# Patient Record
Sex: MALE | Race: WHITE | NOT HISPANIC OR LATINO | Employment: OTHER | URBAN - METROPOLITAN AREA
[De-identification: names, ages, dates, MRNs, and addresses within clinical notes are randomized per-mention and may not be internally consistent; named-entity substitution may affect disease eponyms.]

---

## 2018-08-16 NOTE — TELEPHONE ENCOUNTER
FUTURE VISIT INFORMATION      FUTURE VISIT INFORMATION:    Date: 8/24/18    Time: 0920    Location: [facility]  REFERRAL INFORMATION:    Referring provider:  N/A    Referring providers clinic:  N/A    Reason for visit/diagnosis  LUMBAR STENOSIS      RECORDS STATUS      RECORDS IN CHART

## 2018-08-22 DIAGNOSIS — M48.061 SPINAL STENOSIS OF LUMBAR REGION WITHOUT NEUROGENIC CLAUDICATION: Primary | ICD-10-CM

## 2018-08-24 ENCOUNTER — PRE VISIT (OUTPATIENT)
Dept: ORTHOPEDICS | Facility: CLINIC | Age: 68
End: 2018-08-24

## 2022-05-09 ENCOUNTER — TRANSCRIBE ORDERS (OUTPATIENT)
Dept: OTHER | Age: 72
End: 2022-05-09
Payer: MEDICARE

## 2022-05-09 DIAGNOSIS — M17.12 PRIMARY OSTEOARTHRITIS OF LEFT KNEE: Primary | ICD-10-CM

## 2022-05-26 ENCOUNTER — THERAPY VISIT (OUTPATIENT)
Dept: PHYSICAL THERAPY | Facility: CLINIC | Age: 72
End: 2022-05-26
Payer: MEDICARE

## 2022-05-26 DIAGNOSIS — Z47.1 AFTERCARE FOLLOWING JOINT REPLACEMENT: ICD-10-CM

## 2022-05-26 DIAGNOSIS — Z96.652 PRESENCE OF LEFT ARTIFICIAL KNEE JOINT: ICD-10-CM

## 2022-05-26 PROBLEM — M25.562 LEFT KNEE PAIN: Status: ACTIVE | Noted: 2022-05-26

## 2022-05-26 PROCEDURE — 97110 THERAPEUTIC EXERCISES: CPT | Mod: GP | Performed by: PHYSICAL THERAPIST

## 2022-05-26 PROCEDURE — 97161 PT EVAL LOW COMPLEX 20 MIN: CPT | Mod: GP | Performed by: PHYSICAL THERAPIST

## 2022-05-26 ASSESSMENT — ACTIVITIES OF DAILY LIVING (ADL)
GO DOWN STAIRS: ACTIVITY IS VERY DIFFICULT
LIMPING: THE SYMPTOM AFFECTS MY ACTIVITY SEVERELY
WEAKNESS: THE SYMPTOM AFFECTS MY ACTIVITY SEVERELY
KNEEL ON THE FRONT OF YOUR KNEE: I AM UNABLE TO DO THE ACTIVITY
RISE FROM A CHAIR: ACTIVITY IS FAIRLY DIFFICULT
AS_A_RESULT_OF_YOUR_KNEE_INJURY,_HOW_WOULD_YOU_RATE_YOUR_CURRENT_LEVEL_OF_DAILY_ACTIVITY?: SEVERELY ABNORMAL
GIVING WAY, BUCKLING OR SHIFTING OF KNEE: I DO NOT HAVE THE SYMPTOM
STAND: ACTIVITY IS FAIRLY DIFFICULT
KNEE_ACTIVITY_OF_DAILY_LIVING_SUM: 20
GO UP STAIRS: ACTIVITY IS VERY DIFFICULT
SWELLING: THE SYMPTOM AFFECTS MY ACTIVITY SEVERELY
SIT WITH YOUR KNEE BENT: ACTIVITY IS FAIRLY DIFFICULT
WALK: ACTIVITY IS FAIRLY DIFFICULT
HOW_WOULD_YOU_RATE_THE_CURRENT_FUNCTION_OF_YOUR_KNEE_DURING_YOUR_USUAL_DAILY_ACTIVITIES_ON_A_SCALE_FROM_0_TO_100_WITH_100_BEING_YOUR_LEVEL_OF_KNEE_FUNCTION_PRIOR_TO_YOUR_INJURY_AND_0_BEING_THE_INABILITY_TO_PERFORM_ANY_OF_YOUR_USUAL_DAILY_ACTIVITIES?: 25
SQUAT: I AM UNABLE TO DO THE ACTIVITY
PAIN: THE SYMPTOM AFFECTS MY ACTIVITY SEVERELY
HOW_WOULD_YOU_RATE_THE_OVERALL_FUNCTION_OF_YOUR_KNEE_DURING_YOUR_USUAL_DAILY_ACTIVITIES?: SEVERELY ABNORMAL
STIFFNESS: THE SYMPTOM AFFECTS MY ACTIVITY SEVERELY
RAW_SCORE: 20
KNEE_ACTIVITY_OF_DAILY_LIVING_SCORE: 28.57

## 2022-05-26 NOTE — PROGRESS NOTES
[facility]    OUTPATIENT Physical Therapy ORTHOPEDIC EVALUATION  PLAN OF TREATMENT FOR OUTPATIENT REHABILITATION  (COMPLETE FOR INITIAL CLAIMS ONLY)  Patient's Last Name, First Name, M.I.  YOB: 1950  Sudarshan Rojas    Provider s Name:  [facility]   Medical Record No.  [id number]   Start of Care Date:  05/26/22   Onset Date:  05/23/22    Type:     _X__PT   ___OT Medical Diagnosis:    Encounter Diagnoses   Name Primary?    Aftercare following joint replacement     Presence of left artificial knee joint         Treatment Diagnosis:  L TKA        Goals:     05/26/22 0500   Body Part   Goals listed below are for knee   Goal #1   Goal #1 ambulation   Previous Functional Level No restrictions   Current Functional Level Minutes patient can walk;with walker   Performance Level 5-10   STG Target Performance Minutes patient will be able to walk;with cane   Performance Level 15-20   Rationale for safe household ambulation;for safe outdoor household ambulation;for safe community ambulation   Due Date 06/23/22    LTG Target Performance Minutes patient will be able to  walk;without assistive device   Performance Level 30   Rationale for safe household ambulation;for safe outdoor household ambulation;for safe community ambulation;to promote a healthy and active lifestyle   Due Date 07/21/22       Therapy Frequency:  1x weekly for  Predicted Duration of Therapy Intervention:  8 weeks    Petty Zurita PT                  05/26/22 0500   Body Part   Goals listed below are for knee   Goal #1   Goal #1 ambulation   Previous Functional Level No restrictions   Current Functional Level Minutes patient can walk;with walker   Performance Level 5-10   STG Target Performance Minutes patient will be able to walk;with cane   Performance Level 15-20   Rationale for safe household  ambulation;for safe outdoor household ambulation;for safe community ambulation   Due Date 06/23/22    LTG Target Performance Minutes patient will be able to  walk;without assistive device   Performance Level 30   Rationale for safe household ambulation;for safe outdoor household ambulation;for safe community ambulation;to promote a healthy and active lifestyle   Due Date 07/21/22              Certification Date From:  05/26/22   Certification Date To:  08/23/22    Referring Provider:  Tobias Gusman    Initial Assessment        See Epic Evaluation SOC Date: 05/26/22

## 2022-05-26 NOTE — PROGRESS NOTES
[facility]    OUTPATIENT Physical Therapy ORTHOPEDIC EVALUATION  PLAN OF TREATMENT FOR OUTPATIENT REHABILITATION  (COMPLETE FOR INITIAL CLAIMS ONLY)  Patient's Last Name, First Name, M.I.  YOB: 1950  Sudarshan Rojas    Provider s Name:  [facility]   Medical Record No.  [id number]   Start of Care Date:  05/26/22   Onset Date:  05/23/22    Type:     _X__PT   ___OT Medical Diagnosis:    Encounter Diagnoses   Name Primary?    Aftercare following joint replacement     Presence of left artificial knee joint         Treatment Diagnosis:  L TKA        Goals:     05/26/22 0500   Body Part   Goals listed below are for knee   Goal #1   Goal #1 ambulation   Previous Functional Level No restrictions   Current Functional Level Minutes patient can walk;with walker   Performance Level 5-10   STG Target Performance Minutes patient will be able to walk;with cane   Performance Level 15-20   Rationale for safe household ambulation;for safe outdoor household ambulation;for safe community ambulation   Due Date 06/23/22    LTG Target Performance Minutes patient will be able to  walk;without assistive device   Performance Level 30   Rationale for safe household ambulation;for safe outdoor household ambulation;for safe community ambulation;to promote a healthy and active lifestyle   Due Date 07/21/22       Therapy Frequency:  1x weekly for  Predicted Duration of Therapy Intervention:  8 weeks    Petty Zurita PT                 I CERTIFY THE NEED FOR THESE SERVICES FURNISHED UNDER        THIS PLAN OF TREATMENT AND WHILE UNDER MY CARE .             Physician Signature               Date    X_____________________________________________________                         Certification Date From:  05/26/22   Certification Date To:  08/23/22    Referring Provider:  Tobias Mtz  Assessment        See Epic Evaluation SOC Date: 05/26/22

## 2022-05-26 NOTE — PROGRESS NOTES
[facility]    OUTPATIENT Physical Therapy ORTHOPEDIC EVALUATION  PLAN OF TREATMENT FOR OUTPATIENT REHABILITATION  (COMPLETE FOR INITIAL CLAIMS ONLY)  Patient's Last Name, First Name, M.I.  YOB: 1950  Sudarshan Rojas    Provider s Name:  [facility]   Medical Record No.  [id number]   Start of Care Date:  05/26/22   Onset Date:    (5/9/22 MD order for PT)   Type:     _X__PT   ___OT Medical Diagnosis:    Encounter Diagnoses   Name Primary?    Aftercare following joint replacement     Presence of left artificial knee joint         Treatment Diagnosis:  L TKA        Goals:     05/26/22 0500   Body Part   Goals listed below are for knee   Goal #1   Goal #1 ambulation   Previous Functional Level No restrictions   Current Functional Level Minutes patient can walk;with walker   Performance Level 5-10   STG Target Performance Minutes patient will be able to walk;with cane   Performance Level 15-20   Rationale for safe household ambulation;for safe outdoor household ambulation;for safe community ambulation   Due Date 06/23/22    LTG Target Performance Minutes patient will be able to  walk;without assistive device   Performance Level 30   Rationale for safe household ambulation;for safe outdoor household ambulation;for safe community ambulation;to promote a healthy and active lifestyle   Due Date 07/21/22       Therapy Frequency:  1x weekly for  Predicted Duration of Therapy Intervention:  8 weeks    Petty Zurita PT                 I CERTIFY THE NEED FOR THESE SERVICES FURNISHED UNDER        THIS PLAN OF TREATMENT AND WHILE UNDER MY CARE .             Physician Signature               Date    X_____________________________________________________                         Certification Date From:  05/26/22   Certification Date To:  08/23/22    Referring Provider:  Tobias Rudolph  Uzma    Initial Assessment        See Epic Evaluation SOC Date: 05/26/22

## 2022-05-26 NOTE — PROGRESS NOTES
Physical Therapy Initial Evaluation  Subjective:  The history is provided by the patient. No  was used.   Patient Health History  Sudarshan Rojas being seen for L knee TKA.     Date of Onset: 5/9/22 MD order for PT.   Problem occurred: reports chronic L knee pain(OA) for 5 years with a TKA on 5/23/22.     Pain is reported as 5/10 on pain scale.  General health as reported by patient is good.  Pertinent medical history includes: high blood pressure, heart problems, history of fractures and implanted device.   Red flags:  None as reported by patient.  Medical allergies: none.   Surgeries include:  Heart surgery, orthopedic surgery and other. Other surgery history details: R TKA 2012, B MICHELL, 2006 quad bypass .    Current medications:  High blood pressure medication and pain medication.    Current occupation is retired.                     Therapist Generated HPI Evaluation         Type of problem:  Left knee.    This is a new condition.  Condition occurred with:  Degenerative joint disease.    Patient reports pain:  Anterior.  Pain is described as aching and is intermittent.  Pain is the same all the time.  Since onset symptoms are gradually improving.  Associated symptoms:  Loss of motion/stiffness. Symptoms are exacerbated by transfers, walking, standing, ascending stairs and descending stairs  and relieved by ice and other (pain meds ).  Special tests included:  MRI and x-ray.    Barriers include:  None as reported by patient.                        Objective:    Gait:    Gait Type:  Antalgic   Weight Bearing Status:  WBAT   Assistive Devices:  Walker  Deviations:  Lumbar:  Trunk flexionKnee:  Knee flexion decr L and knee extension decr LAnkle:  Heel strike decr L and push off decr LGeneral Deviations:  Marya decr and stance time decr                                                      Knee Evaluation:  ROM:  Strength:  Not assessed  AROM    Hyperextension:  Left:  0    Right:  0  Extension:  Left: 10    Right:  0  Flexion: Left: 70    Right: 122  PROM    Hyperextension: Left: 0    Right:  0  Extension: Left: 8    Right:  0  Flexion: Left: 74    Right:  124      Strength:         Quad Set Left:  Fair    Pain: +/-         Palpation:    Left knee tenderness present at:  Incisional              General     ROS    Assessment/Plan:    Patient is a 72 year old male with left side knee complaints.    Patient has the following significant findings with corresponding treatment plan.                Diagnosis 1:  Knee pain, OA  Pain -  hot/cold therapy, manual therapy, self management, education and home program  Decreased ROM/flexibility - manual therapy, therapeutic exercise and home program  Decreased joint mobility - manual therapy, therapeutic exercise and home program  Decreased strength - therapeutic exercise, therapeutic activities and home program  Decreased proprioception - neuro re-education, therapeutic activities and home program  Inflammation - cold therapy and self management/home program  Edema - cold therapy and self management/home program  Impaired gait - gait training and home program  Impaired muscle performance - neuro re-education and home program  Decreased function - therapeutic activities and home program    Therapy Evaluation Codes:   1) History comprised of:   Personal factors that impact the plan of care:      None.    Comorbidity factors that impact the plan of care are:      None.     Medications impacting care: None.  2) Examination of Body Systems comprised of:   Body structures and functions that impact the plan of care:      Knee.   Activity limitations that impact the plan of care are:      Squatting/kneeling, Stairs, Standing and Walking.  3) Clinical presentation characteristics are:   Stable/Uncomplicated.  4) Decision-Making    Low complexity using standardized patient assessment instrument and/or measureable assessment of functional outcome.  Cumulative  Therapy Evaluation is: Low complexity.    Previous and current functional limitations:  (See Goal Flow Sheet for this information)    Short term and Long term goals: (See Goal Flow Sheet for this information)     Communication ability:  Patient appears to be able to clearly communicate and understand verbal and written communication and follow directions correctly.  Treatment Explanation - The following has been discussed with the patient:   RX ordered/plan of care  Anticipated outcomes  Possible risks and side effects  This patient would benefit from PT intervention to resume normal activities.   Rehab potential is good.    Frequency:  1 X week, once daily  Duration:  for 8 weeks  Discharge Plan:  Achieve all LTG.  Independent in home treatment program.  Reach maximal therapeutic benefit.    Please refer to the daily flowsheet for treatment today, total treatment time and time spent performing 1:1 timed codes.

## 2022-06-02 ENCOUNTER — THERAPY VISIT (OUTPATIENT)
Dept: PHYSICAL THERAPY | Facility: CLINIC | Age: 72
End: 2022-06-02
Payer: MEDICARE

## 2022-06-02 DIAGNOSIS — Z47.1 AFTERCARE FOLLOWING JOINT REPLACEMENT: ICD-10-CM

## 2022-06-02 DIAGNOSIS — M25.562 LEFT KNEE PAIN: Primary | ICD-10-CM

## 2022-06-02 DIAGNOSIS — Z96.652 PRESENCE OF LEFT ARTIFICIAL KNEE JOINT: ICD-10-CM

## 2022-06-02 PROCEDURE — 97110 THERAPEUTIC EXERCISES: CPT | Mod: GP | Performed by: PHYSICAL THERAPIST

## 2022-06-02 PROCEDURE — 97112 NEUROMUSCULAR REEDUCATION: CPT | Mod: GP | Performed by: PHYSICAL THERAPIST

## 2022-06-07 ENCOUNTER — THERAPY VISIT (OUTPATIENT)
Dept: PHYSICAL THERAPY | Facility: CLINIC | Age: 72
End: 2022-06-07
Payer: MEDICARE

## 2022-06-07 DIAGNOSIS — M25.562 LEFT KNEE PAIN: Primary | ICD-10-CM

## 2022-06-07 DIAGNOSIS — Z47.1 AFTERCARE FOLLOWING JOINT REPLACEMENT: ICD-10-CM

## 2022-06-07 DIAGNOSIS — Z96.652 PRESENCE OF LEFT ARTIFICIAL KNEE JOINT: ICD-10-CM

## 2022-06-07 PROCEDURE — 97110 THERAPEUTIC EXERCISES: CPT | Mod: GP | Performed by: PHYSICAL THERAPIST

## 2022-06-07 PROCEDURE — 97116 GAIT TRAINING THERAPY: CPT | Mod: GP | Performed by: PHYSICAL THERAPIST

## 2022-06-07 PROCEDURE — 97010 HOT OR COLD PACKS THERAPY: CPT | Mod: GP | Performed by: PHYSICAL THERAPIST

## 2022-06-07 NOTE — PROGRESS NOTES
Subjective:  HPI  Physical Exam                    Objective:  System    Physical Exam    General     ROS    Assessment/Plan:    SUBJECTIVE  Subjective changes as noted by pt:  Tarun reports he's doing well, had big bandage removed yesterday. Not using oxycontin anymore, ibuprofen only. Using cane. Happy with progress.        Current pain level: 3/10     Changes in function:  Yes (See Goal flowsheet attached for changes in current functional level)     Adverse reaction to treatment or activity:  None    OBJECTIVE  Changes in objective findings:  Yes, L knee PROM 0-3-83 (heelslide)    Ambulating with SEC, cues needed for swing through with R arm/cane arm.         ASSESSMENT  Sudarshan continues to require intervention to meet STG and LTG's: PT  Patient's symptoms are resolving.  Response to therapy has shown an improvement in  pain level, ROM  and function  Progress made towards STG/LTG?  Yes (See Goal flowsheet attached for updates on achievement of STG and LTG)    PLAN  Continue current treatment plan until patient demonstrates readiness to progress to higher level exercises.    PTA/ATC plan:  N/A    Please refer to the daily flowsheet for treatment today, total treatment time and time spent performing 1:1 timed codes.

## 2022-06-09 ENCOUNTER — THERAPY VISIT (OUTPATIENT)
Dept: PHYSICAL THERAPY | Facility: CLINIC | Age: 72
End: 2022-06-09
Payer: MEDICARE

## 2022-06-09 DIAGNOSIS — Z47.1 AFTERCARE FOLLOWING LEFT KNEE JOINT REPLACEMENT SURGERY: ICD-10-CM

## 2022-06-09 DIAGNOSIS — Z96.652 PRESENCE OF LEFT ARTIFICIAL KNEE JOINT: ICD-10-CM

## 2022-06-09 DIAGNOSIS — Z96.652 AFTERCARE FOLLOWING LEFT KNEE JOINT REPLACEMENT SURGERY: ICD-10-CM

## 2022-06-09 DIAGNOSIS — M25.562 LEFT KNEE PAIN: Primary | ICD-10-CM

## 2022-06-09 PROCEDURE — 97530 THERAPEUTIC ACTIVITIES: CPT | Mod: GP | Performed by: PHYSICAL THERAPIST

## 2022-06-09 PROCEDURE — 97110 THERAPEUTIC EXERCISES: CPT | Mod: GP | Performed by: PHYSICAL THERAPIST

## 2022-06-13 ENCOUNTER — THERAPY VISIT (OUTPATIENT)
Dept: PHYSICAL THERAPY | Facility: CLINIC | Age: 72
End: 2022-06-13
Payer: MEDICARE

## 2022-06-13 DIAGNOSIS — Z96.652 PRESENCE OF LEFT ARTIFICIAL KNEE JOINT: ICD-10-CM

## 2022-06-13 DIAGNOSIS — M25.562 LEFT KNEE PAIN: Primary | ICD-10-CM

## 2022-06-13 DIAGNOSIS — Z47.1 AFTERCARE FOLLOWING JOINT REPLACEMENT: ICD-10-CM

## 2022-06-13 PROCEDURE — 97110 THERAPEUTIC EXERCISES: CPT | Mod: GP | Performed by: PHYSICAL THERAPIST

## 2022-06-13 PROCEDURE — 97530 THERAPEUTIC ACTIVITIES: CPT | Mod: GP | Performed by: PHYSICAL THERAPIST

## 2022-06-16 ENCOUNTER — THERAPY VISIT (OUTPATIENT)
Dept: PHYSICAL THERAPY | Facility: CLINIC | Age: 72
End: 2022-06-16
Payer: MEDICARE

## 2022-06-16 DIAGNOSIS — M25.562 LEFT KNEE PAIN: Primary | ICD-10-CM

## 2022-06-16 DIAGNOSIS — Z47.1 AFTERCARE FOLLOWING JOINT REPLACEMENT: ICD-10-CM

## 2022-06-16 DIAGNOSIS — Z96.652 PRESENCE OF LEFT ARTIFICIAL KNEE JOINT: ICD-10-CM

## 2022-06-16 PROCEDURE — 97010 HOT OR COLD PACKS THERAPY: CPT | Mod: GP | Performed by: PHYSICAL THERAPIST

## 2022-06-16 PROCEDURE — 97112 NEUROMUSCULAR REEDUCATION: CPT | Mod: GP | Performed by: PHYSICAL THERAPIST

## 2022-06-16 PROCEDURE — 97530 THERAPEUTIC ACTIVITIES: CPT | Mod: GP | Performed by: PHYSICAL THERAPIST

## 2022-06-16 PROCEDURE — 97110 THERAPEUTIC EXERCISES: CPT | Mod: GP | Performed by: PHYSICAL THERAPIST

## 2022-06-16 NOTE — PROGRESS NOTES
"Subjective:  HPI  Physical Exam                    Objective:  System    Physical Exam    General     ROS    Assessment/Plan:    SUBJECTIVE  Subjective changes as noted by pt:  Tarun reports that he doesn't have much pain in the L knee. Less ibuprofen.     Current pain level: 0/10     Changes in function:  Yes (See Goal flowsheet attached for changes in current functional level)     Adverse reaction to treatment or activity:  None    OBJECTIVE  Changes in objective findings:  Yes, L knee PROM 1-0-90 (heelslide).      104 seated with clinician overpressure.     Able to ascend 6\" steps reciprocally. Continued non-reciprocal descending.       ASSESSMENT  Sudarshan continues to require intervention to meet STG and LTG's: PT  Patient is progressing as expected.  Response to therapy has shown an improvement in  pain level, ROM  and function  Progress made towards STG/LTG?  Yes (See Goal flowsheet attached for updates on achievement of STG and LTG)    PLAN  Continue current treatment plan until patient demonstrates readiness to progress to higher level exercises.    PTA/ATC plan:  N/A    Please refer to the daily flowsheet for treatment today, total treatment time and time spent performing 1:1 timed codes.        "

## 2022-06-21 ENCOUNTER — THERAPY VISIT (OUTPATIENT)
Dept: PHYSICAL THERAPY | Facility: CLINIC | Age: 72
End: 2022-06-21
Payer: MEDICARE

## 2022-06-21 DIAGNOSIS — Z96.652 PRESENCE OF LEFT ARTIFICIAL KNEE JOINT: ICD-10-CM

## 2022-06-21 DIAGNOSIS — M25.562 LEFT KNEE PAIN: Primary | ICD-10-CM

## 2022-06-21 DIAGNOSIS — Z47.1 AFTERCARE FOLLOWING JOINT REPLACEMENT: ICD-10-CM

## 2022-06-21 PROCEDURE — 97112 NEUROMUSCULAR REEDUCATION: CPT | Mod: GP | Performed by: PHYSICAL THERAPIST

## 2022-06-21 PROCEDURE — 97110 THERAPEUTIC EXERCISES: CPT | Mod: GP | Performed by: PHYSICAL THERAPIST

## 2022-06-21 PROCEDURE — 97010 HOT OR COLD PACKS THERAPY: CPT | Mod: GP | Performed by: PHYSICAL THERAPIST

## 2022-06-21 PROCEDURE — 97530 THERAPEUTIC ACTIVITIES: CPT | Mod: GP | Performed by: PHYSICAL THERAPIST

## 2022-06-21 NOTE — PROGRESS NOTES
Subjective:  HPI  Physical Exam                    Objective:  System    Physical Exam    General     ROS    Assessment/Plan:    SUBJECTIVE  Subjective changes as noted by pt:  Doing very well. Not having much pain and is completely off all medications.     Current pain level: 0/10     Changes in function:  Yes (See Goal flowsheet attached for changes in current functional level)     Adverse reaction to treatment or activity:  None    OBJECTIVE  Changes in objective findings:  Yes, L knee flexion PROM 113 (seated with clinician overpressure).        ASSESSMENT  Sudarshan continues to require intervention to meet STG and LTG's: PT  Patient's symptoms are resolving.  Patient is progressing as expected.  Response to therapy has shown an improvement in  pain level, ROM  and function  Progress made towards STG/LTG?  Yes (See Goal flowsheet attached for updates on achievement of STG and LTG)    PLAN  Continue current treatment plan until patient demonstrates readiness to progress to higher level exercises.    PTA/ATC plan:  N/A    Please refer to the daily flowsheet for treatment today, total treatment time and time spent performing 1:1 timed codes.

## 2022-08-15 NOTE — PROGRESS NOTES
Discharge Note    Progress reporting period is from initial evaluation date (please see noted date below) to Jun 21, 2022.  No linked episodes      Sudarshan failed to follow up and current status is unknown.  Please see information below for last relevant information on current status.  Patient seen for 7 visits.    SUBJECTIVE  Subjective changes noted by patient:     .  Current pain level is  .     Previous pain level was   .   Changes in function:  Yes (See Goal flowsheet attached for changes in current functional level)  Adverse reaction to treatment or activity: None    OBJECTIVE  Changes noted in objective findings:       ASSESSMENT/PLAN  Diagnosis: L TKA   Updated problem list and treatment plan:   Pain - HEP  Decreased ROM/flexibility - HEP  Decreased function - HEP  Decreased strength - HEP  STG/LTGs have been met or progress has been made towards goals:  Yes, please see goal flowsheet for most current information  Assessment of Progress: current status is unknown.    Last current status:     Self Management Plans:  HEP  I have re-evaluated this patient and find that the nature, scope, duration and intensity of the therapy is appropriate for the medical condition of the patient.  Sudarshan continues to require the following intervention to meet STG and LTG's:  HEP.    Recommendations:  Discharge with current home program.  Patient to follow up with MD as needed.    Please refer to the daily flowsheet for treatment today, total treatment time and time spent performing 1:1 timed codes.

## 2022-09-02 ENCOUNTER — TRANSCRIBE ORDERS (OUTPATIENT)
Dept: OTHER | Age: 72
End: 2022-09-02

## 2022-09-02 DIAGNOSIS — Z47.89 AFTERCARE FOLLOWING SURGERY OF THE MUSCULOSKELETAL SYSTEM: Primary | ICD-10-CM

## 2022-09-12 ENCOUNTER — THERAPY VISIT (OUTPATIENT)
Dept: PHYSICAL THERAPY | Facility: CLINIC | Age: 72
End: 2022-09-12
Payer: MEDICARE

## 2022-09-12 DIAGNOSIS — Z96.659 AFTERCARE FOLLOWING KNEE JOINT REPLACEMENT SURGERY, UNSPECIFIED LATERALITY: ICD-10-CM

## 2022-09-12 DIAGNOSIS — M25.562 ACUTE PAIN OF LEFT KNEE: Primary | ICD-10-CM

## 2022-09-12 DIAGNOSIS — Z47.1 AFTERCARE FOLLOWING KNEE JOINT REPLACEMENT SURGERY, UNSPECIFIED LATERALITY: ICD-10-CM

## 2022-09-12 DIAGNOSIS — Z96.652 STATUS POST TOTAL LEFT KNEE REPLACEMENT: ICD-10-CM

## 2022-09-12 PROCEDURE — 97161 PT EVAL LOW COMPLEX 20 MIN: CPT | Mod: GP | Performed by: PHYSICAL THERAPIST

## 2022-09-12 PROCEDURE — 97110 THERAPEUTIC EXERCISES: CPT | Mod: GP | Performed by: PHYSICAL THERAPIST

## 2022-09-12 PROCEDURE — 97530 THERAPEUTIC ACTIVITIES: CPT | Mod: GP | Performed by: PHYSICAL THERAPIST

## 2022-09-12 ASSESSMENT — ACTIVITIES OF DAILY LIVING (ADL)
SIT WITH YOUR KNEE BENT: ACTIVITY IS NOT DIFFICULT
STIFFNESS: I HAVE THE SYMPTOM BUT IT DOES NOT AFFECT MY ACTIVITY
STAND: ACTIVITY IS NOT DIFFICULT
PAIN: THE SYMPTOM AFFECTS MY ACTIVITY SLIGHTLY
SQUAT: I AM UNABLE TO DO THE ACTIVITY
WALK: ACTIVITY IS NOT DIFFICULT
KNEE_ACTIVITY_OF_DAILY_LIVING_SCORE: 67.14
WEAKNESS: THE SYMPTOM AFFECTS MY ACTIVITY SLIGHTLY
RISE FROM A CHAIR: ACTIVITY IS MINIMALLY DIFFICULT
GO UP STAIRS: ACTIVITY IS SOMEWHAT DIFFICULT
SWELLING: I HAVE THE SYMPTOM BUT IT DOES NOT AFFECT MY ACTIVITY
KNEEL ON THE FRONT OF YOUR KNEE: I AM UNABLE TO DO THE ACTIVITY
RAW_SCORE: 47
AS_A_RESULT_OF_YOUR_KNEE_INJURY,_HOW_WOULD_YOU_RATE_YOUR_CURRENT_LEVEL_OF_DAILY_ACTIVITY?: NEARLY NORMAL
LIMPING: I HAVE THE SYMPTOM BUT IT DOES NOT AFFECT MY ACTIVITY
GO DOWN STAIRS: ACTIVITY IS FAIRLY DIFFICULT
HOW_WOULD_YOU_RATE_THE_CURRENT_FUNCTION_OF_YOUR_KNEE_DURING_YOUR_USUAL_DAILY_ACTIVITIES_ON_A_SCALE_FROM_0_TO_100_WITH_100_BEING_YOUR_LEVEL_OF_KNEE_FUNCTION_PRIOR_TO_YOUR_INJURY_AND_0_BEING_THE_INABILITY_TO_PERFORM_ANY_OF_YOUR_USUAL_DAILY_ACTIVITIES?: 65
HOW_WOULD_YOU_RATE_THE_OVERALL_FUNCTION_OF_YOUR_KNEE_DURING_YOUR_USUAL_DAILY_ACTIVITIES?: NEARLY NORMAL
GIVING WAY, BUCKLING OR SHIFTING OF KNEE: I DO NOT HAVE THE SYMPTOM
KNEE_ACTIVITY_OF_DAILY_LIVING_SUM: 47

## 2022-09-12 NOTE — PROGRESS NOTES
[facility]    OUTPATIENT Physical Therapy ORTHOPEDIC EVALUATION  PLAN OF TREATMENT FOR OUTPATIENT REHABILITATION  (COMPLETE FOR INITIAL CLAIMS ONLY)  Patient's Last Name, First Name, M.I.  YOB: 1950  Sudarshan Rojas    Provider s Name:  [facility]   Medical Record No.  [id number]   Start of Care Date:  09/12/22   Onset Date:  05/23/2207/02/22   Type:     _X__PT   ___OT Medical Diagnosis:    Encounter Diagnoses   Name Primary?    Aftercare following knee joint replacement surgery, unspecified laterality     Status post total left knee replacement     Acute pain of left knee Yes        Treatment Diagnosis:  s/p L TKA, with subsuquent infection/sepsis        Goals:     09/12/22 0500   Body Part   Goals listed below are for L knee   Goal #2   Goal #2 stairs   Previous Functional Level No restrictions   Current Functional Level one step at a time;with a railing;Ascend/descend stairs   STG Target Performance Ascend stairs;in a normal reciprocal pattern;with a railing   Rationale to enter/leave the house safely;to reach upper level of home safely;to reach lower level of home safely;for safe community access to buildings;for safe community ambulaton   Due Date 10/12/22   LTG Target Performance Ascend/descend stairs;in a normal reciprocal pattern;with railing   Rationale to enter/leave the house safely;to reach upper level of home safely;to reach lower level of home safely;for safe community access to buildings;for safe community ambulaton   Due Date 11/12/22       Therapy Frequency:  1x/wk  Predicted Duration of Therapy Intervention:  12 wks    Jack Liu, PT                 I CERTIFY THE NEED FOR THESE SERVICES FURNISHED UNDER        THIS PLAN OF TREATMENT AND WHILE UNDER MY CARE .             Physician Signature                Date    X_____________________________________________________                         Certification Date From:  09/12/22   Certification Date To:  11/10/22    Referring Provider:  Tobias Gusman    Initial Assessment        See Epic Evaluation SOC Date: 09/12/22

## 2022-09-12 NOTE — PROGRESS NOTES
"Physical Therapy Initial Evaluation  Subjective:  The history is provided by the patient. No  was used.   Therapist Generated HPI Evaluation  Problem details: Pt underwent L TKA on 5/23/22. Had been doing PT and progressing very well. On 7/2/22, noticed draining in the incision and developed an infection, including sepsis. Was hospitalized in ICU for ten days. When he got discharged to home, he did PT at home and that went well. MD referred to outpatient PT to continue his progression with functional activities. Tarun reports that overall he feels \"great.\" Using Tylenol 1-2x/day which helps. .         Type of problem:  Left knee.    This is a new condition.  Condition occurred with:  Insidious onset and degenerative joint disease.  Where condition occurred: for unknown reasons.  Patient reports pain:  Anterior.  Pain is described as aching and is intermittent.    Since onset symptoms are gradually improving.  Associated symptoms:  Loss of strength and loss of motion/stiffness. Symptoms are exacerbated by certain positions, ascending stairs and descending stairs  and relieved by analgesics.  Special tests included:  X-ray.  Previous treatment includes physical therapy.       Patient Health History         Pain is reported as 3/10 on pain scale.  General health as reported by patient is good.  Pertinent medical history includes: heart problems, high blood pressure and implanted device.   Red flags:  None as reported by patient.  Medical allergies: none.   Surgeries include:  Orthopedic surgery. Other surgery history details: B MICHELL, R TKA.    Current medications:  High blood pressure medication, other and pain medication. Other medications details: Tylenol, antibiotic.    Current occupation is retired.                                       Objective:    Gait:  Has limped since teenager due to L knee injury          Flexibility/Screens:       Lower Extremity:  Decreased left lower extremity " "flexibility:Gastroc                                                        Knee Evaluation:  ROM:    AROM      Extension: Left: 1    Right:     PROM        Flexion: Left: 113   Right:                   Functional Testing:  : Able to negotiate set of 6\" stairs reciprocaly with SBA and railing both ascending/descending.                  General     ROS    Assessment/Plan:    Patient is a 72 year old male with left side knee complaints.    Patient has the following significant findings with corresponding treatment plan.                Diagnosis 1:  S/p L knee TKA with subsequent infection  Pain -  self management, education and home program  Decreased ROM/flexibility - manual therapy, therapeutic exercise, therapeutic activity and home program  Decreased joint mobility - manual therapy, therapeutic exercise, therapeutic activity and home program  Decreased strength - therapeutic exercise, therapeutic activities and home program  Inflammation - self management/home program  Impaired gait - gait training and home program  Decreased function - therapeutic activities and home program    Cumulative Therapy Evaluation is: Low complexity.    Previous and current functional limitations:  (See Goal Flow Sheet for this information)    Short term and Long term goals: (See Goal Flow Sheet for this information)     Communication ability:  Patient appears to be able to clearly communicate and understand verbal and written communication and follow directions correctly.  Treatment Explanation - The following has been discussed with the patient:   RX ordered/plan of care  Anticipated outcomes  Possible risks and side effects  This patient would benefit from PT intervention to resume normal activities.   Rehab potential is excellent.    Frequency:  1 X week, once daily  Duration:  for 12   weeks  Discharge Plan:  Achieve all LTG.  Independent in home treatment program.  Reach maximal therapeutic benefit.    Please refer to the daily " flowsheet for treatment today, total treatment time and time spent performing 1:1 timed codes.        Improved

## 2022-09-20 ENCOUNTER — THERAPY VISIT (OUTPATIENT)
Dept: PHYSICAL THERAPY | Facility: CLINIC | Age: 72
End: 2022-09-20
Payer: MEDICARE

## 2022-09-20 DIAGNOSIS — Z47.1 AFTERCARE FOLLOWING KNEE JOINT REPLACEMENT SURGERY, UNSPECIFIED LATERALITY: Primary | ICD-10-CM

## 2022-09-20 DIAGNOSIS — Z96.659 AFTERCARE FOLLOWING KNEE JOINT REPLACEMENT SURGERY, UNSPECIFIED LATERALITY: Primary | ICD-10-CM

## 2022-09-20 DIAGNOSIS — Z96.652 STATUS POST TOTAL LEFT KNEE REPLACEMENT: ICD-10-CM

## 2022-09-20 PROCEDURE — 97110 THERAPEUTIC EXERCISES: CPT | Mod: 59 | Performed by: PHYSICAL THERAPIST

## 2022-09-20 PROCEDURE — 97530 THERAPEUTIC ACTIVITIES: CPT | Mod: GP | Performed by: PHYSICAL THERAPIST

## 2022-09-29 ENCOUNTER — THERAPY VISIT (OUTPATIENT)
Dept: PHYSICAL THERAPY | Facility: CLINIC | Age: 72
End: 2022-09-29
Payer: MEDICARE

## 2022-09-29 DIAGNOSIS — Z96.659 AFTERCARE FOLLOWING KNEE JOINT REPLACEMENT SURGERY, UNSPECIFIED LATERALITY: Primary | ICD-10-CM

## 2022-09-29 DIAGNOSIS — Z47.1 AFTERCARE FOLLOWING KNEE JOINT REPLACEMENT SURGERY, UNSPECIFIED LATERALITY: Primary | ICD-10-CM

## 2022-09-29 DIAGNOSIS — Z96.652 STATUS POST TOTAL LEFT KNEE REPLACEMENT: ICD-10-CM

## 2022-09-29 PROCEDURE — 97112 NEUROMUSCULAR REEDUCATION: CPT | Mod: GP | Performed by: PHYSICAL THERAPIST

## 2022-09-29 PROCEDURE — 97530 THERAPEUTIC ACTIVITIES: CPT | Mod: GP | Performed by: PHYSICAL THERAPIST

## 2022-09-29 PROCEDURE — 97110 THERAPEUTIC EXERCISES: CPT | Mod: GP | Performed by: PHYSICAL THERAPIST

## 2022-11-10 PROBLEM — Z47.1 AFTERCARE FOLLOWING KNEE JOINT REPLACEMENT SURGERY, UNSPECIFIED LATERALITY: Status: RESOLVED | Noted: 2022-09-12 | Resolved: 2022-11-10

## 2022-11-10 PROBLEM — Z96.652 STATUS POST TOTAL LEFT KNEE REPLACEMENT: Status: RESOLVED | Noted: 2022-09-12 | Resolved: 2022-11-10

## 2022-11-10 PROBLEM — Z96.659 AFTERCARE FOLLOWING KNEE JOINT REPLACEMENT SURGERY, UNSPECIFIED LATERALITY: Status: RESOLVED | Noted: 2022-09-12 | Resolved: 2022-11-10

## 2022-11-10 NOTE — PROGRESS NOTES
"Subjective:  HPI  Physical Exam                    Objective:  System    Physical Exam    General     ROS    Assessment/Plan:    DISCHARGE REPORT    Progress reporting period is from 9/12/22 to 9/29/22.     SUBJECTIVE  Subjective: Tarun reports that his knee is doing well- no pain or new concerns in the past week. HEP is going well.           Changes in function: Yes, see goal flow sheet for change in function   Adverse reactions: None;   ,     The subjective and objective information are from the last SOAP note on this patient.    OBJECTIVE  Objective: L knee PROM:(heel slide) 0-0- 114, SLS R 3-4\" , L 2-3\"- instruct in SL balance exercise with goal of 5-10\" hold min to no UE support. Fatigue noted at 10 reps of L SLR ABD- instruct in alternate hip ABD strengthening ex in standing and clam in SL.      ASSESSMENT/PLAN  Updated problem list and treatment plan: Diagnosis 1:  S/P TKA    STG/LTGs have been met or progress has been made towards goals:  Yes (See Goal flow sheet completed today.)  Assessment of Progress: The patient's condition is improving.  The patient has not returned to therapy. Current status is unknown.  Self Management Plans:  Patient is independent in a home treatment program.  Patient is independent in self management of symptoms.    Sudarshan continues to require the following intervention to meet STG and LTG's: PT  We will discharge this patient from PT.    Recommendations:  Discharge from PT    Please refer to the daily flowsheet for treatment today, total treatment time and time spent performing 1:1 timed codes.    "

## 2023-05-16 ENCOUNTER — TRANSCRIBE ORDERS (OUTPATIENT)
Dept: OTHER | Age: 73
End: 2023-05-16

## 2023-05-16 DIAGNOSIS — Z96.652 S/P TOTAL KNEE ARTHROPLASTY, LEFT: Primary | ICD-10-CM

## 2023-05-16 DIAGNOSIS — Z96.651 S/P TOTAL KNEE ARTHROPLASTY, RIGHT: ICD-10-CM

## 2023-05-22 ENCOUNTER — THERAPY VISIT (OUTPATIENT)
Dept: PHYSICAL THERAPY | Facility: CLINIC | Age: 73
End: 2023-05-22
Payer: MEDICARE

## 2023-05-22 DIAGNOSIS — S76.311A HAMSTRING MUSCLE STRAIN, RIGHT, INITIAL ENCOUNTER: ICD-10-CM

## 2023-05-22 DIAGNOSIS — Z96.652 S/P TOTAL KNEE ARTHROPLASTY, LEFT: ICD-10-CM

## 2023-05-22 DIAGNOSIS — Z96.651 S/P TOTAL KNEE ARTHROPLASTY, RIGHT: ICD-10-CM

## 2023-05-22 PROCEDURE — 97110 THERAPEUTIC EXERCISES: CPT | Mod: GP | Performed by: PHYSICAL THERAPIST

## 2023-05-22 PROCEDURE — 97161 PT EVAL LOW COMPLEX 20 MIN: CPT | Mod: GP | Performed by: PHYSICAL THERAPIST

## 2023-05-22 ASSESSMENT — ACTIVITIES OF DAILY LIVING (ADL)
GO UP STAIRS: ACTIVITY IS VERY DIFFICULT
RAW_SCORE: 34
PAIN: THE SYMPTOM AFFECTS MY ACTIVITY SLIGHTLY
WALK: ACTIVITY IS SOMEWHAT DIFFICULT
HOW_WOULD_YOU_RATE_THE_OVERALL_FUNCTION_OF_YOUR_KNEE_DURING_YOUR_USUAL_DAILY_ACTIVITIES?: NEARLY NORMAL
KNEEL ON THE FRONT OF YOUR KNEE: I AM UNABLE TO DO THE ACTIVITY
HOW_WOULD_YOU_RATE_THE_CURRENT_FUNCTION_OF_YOUR_KNEE_DURING_YOUR_USUAL_DAILY_ACTIVITIES_ON_A_SCALE_FROM_0_TO_100_WITH_100_BEING_YOUR_LEVEL_OF_KNEE_FUNCTION_PRIOR_TO_YOUR_INJURY_AND_0_BEING_THE_INABILITY_TO_PERFORM_ANY_OF_YOUR_USUAL_DAILY_ACTIVITIES?: 70
SWELLING: THE SYMPTOM AFFECTS MY ACTIVITY MODERATELY
SQUAT: ACTIVITY IS FAIRLY DIFFICULT
RISE FROM A CHAIR: ACTIVITY IS MINIMALLY DIFFICULT
LIMPING: I HAVE THE SYMPTOM BUT IT DOES NOT AFFECT MY ACTIVITY
SIT WITH YOUR KNEE BENT: ACTIVITY IS MINIMALLY DIFFICULT
KNEE_ACTIVITY_OF_DAILY_LIVING_SUM: 34
STIFFNESS: THE SYMPTOM AFFECTS MY ACTIVITY SLIGHTLY
GIVING WAY, BUCKLING OR SHIFTING OF KNEE: THE SYMPTOM AFFECTS MY ACTIVITY MODERATELY
KNEE_ACTIVITY_OF_DAILY_LIVING_SCORE: 48.57
GO DOWN STAIRS: ACTIVITY IS VERY DIFFICULT
STAND: ACTIVITY IS FAIRLY DIFFICULT
WEAKNESS: THE SYMPTOM AFFECTS MY ACTIVITY SLIGHTLY

## 2023-05-22 NOTE — PROGRESS NOTES
PHYSICAL THERAPY EVALUATION  Type of Visit: Evaluation    See electronic medical record for Abuse and Falls Screening details.    Subjective      Presenting condition or subjective complaint: Patient reports having a R TKA in  and L TKA on 22 then complications of sepsis with a second L TKA on 7/3/22. He noted improvement overall until pulling his R hamstring during water aerobics in 2023. Currently c/o weakness, poor balance and some soreness in posterior thigh walking and especially with stair climbing.Recent x-rays to his knees were normal.  Date of onset: 23    Relevant medical history: Arthritis; High blood pressure; Implanted device   Dates & types of surgery: R TKA , L TKA 2022, 7/3/22, B MICHELL    Prior diagnostic imaging/testing results: X-ray     Prior therapy history for the same diagnosis, illness or injury: Yes PT summer 2022    Prior Level of Function   Transfers:   Ambulation:   ADL:   IADL:     Living Environment  Social support: With a significant other or spouse   Type of home: House   Stairs to enter the home: No       Ramp: No   Stairs inside the home: Yes 12 Is there a railing: Yes   Help at home: None  Equipment owned:       Employment: No retired  Hobbies/Interests: golf    Patient goals for therapy: stairs, improve balance    Pain assessment: Pain present  Location: right anterior knee/Ratin/10     Objective   KNEE EVALUATION  PAIN: Pain Location: knee  Pain Quality: Aching  Pain Frequency: intermittent  Pain is Worst: daytime  Pain is Exacerbated By: stairs  Pain Progression: Improved  INTEGUMENTARY (edema, incisions):   POSTURE:   GAIT:   Weightbearing Status: WBAT  Assistive Device(s): None  Gait Deviations: Antalgic  Stance time decreased  Marya decreased  Decreased dynamic control R, Trendelenberg R   Balance/Proprioception: Single Leg Stance Eyes Open (seconds): R 2 sec,/  L 5 sec.  Weight Bearing Alignment:   Non-Weight Bearing Alignment:    ROM: R knee  AROM:(heel slide) 0-0- 117, L 0-0- 114    Strength: Hip ABD R 3-/5, L 4+/5, EXT R 3+/5 L 4/5, knee EXT B 5/5, FLX 5/5   FLEXIBILITY: moderate tightness R hamstring  Special Tests:   FUNCTIONAL TESTS:   PALPATION: mild point tenderness R proximal hamstring origin  JOINT MOBILITY:       Assessment & Plan   CLINICAL IMPRESSIONS   Medical Diagnosis: S/P TKA L, S/P TKA R    Treatment Diagnosis: B TKA, R hamstring strain   Impression/Assessment: Patient is a 73 year old male with B knee, R hamstring complaints.  The following significant findings have been identified: Pain, Decreased ROM/flexibility, Decreased strength, Impaired balance, Decreased proprioception, Impaired gait and Impaired muscle performance. These impairments interfere with their ability to perform self care tasks, recreational activities, household chores, household mobility and community mobility as compared to previous level of function.     Clinical Decision Making (Complexity):   Clinical Presentation: Stable/Uncomplicated  Clinical Presentation Rationale: based on medical and personal factors listed in PT evaluation  Clinical Decision Making (Complexity): Low complexity    PLAN OF CARE  Treatment Interventions:  Interventions: Gait Training, Neuromuscular Re-education, Therapeutic Activity, Therapeutic Exercise    Long Term Goals     PT Goal 1  Goal Identifier: stairs  Goal Description: pt branden amb stairs reciprocally ascending descending  Rationale: to maximize safety and independence within the home;to maximize safety and independence within the community;to maximize safety and independence with performance of ADLs and functional tasks  Target Date: 08/19/23      Frequency of Treatment: 1x week  Duration of Treatment: 12 weeks    Recommended Referrals to Other Professionals: Physical Therapy  Education Assessment:   Learner/Method: Patient    Risks and benefits of evaluation/treatment have been explained.   Patient/Family/caregiver agrees with  Plan of Care.     Evaluation Time:     PT Eval, Low Complexity Minutes (99243): 20      Signing Clinician: DAE Alfaro [facility]                                                                                   OUTPATIENT PHYSICAL THERAPY      PLAN OF TREATMENT FOR OUTPATIENT REHABILITATION   Patient's Last Name, First Name, Sudarshan Agrawal YOB: 1950   Provider's Name   [facility]   Medical Record No.  [id number]     Onset Date: 02/01/23  Start of Care Date: 05/22/23     Medical Diagnosis:  S/P TKA L, S/P TKA R      PT Treatment Diagnosis:  B TKA, R hamstring strain Plan of Treatment  Frequency/Duration: 1x week/ 12 weeks    Certification date from 05/22/23 to 08/19/23         See note for plan of treatment details and functional goals     Petty Zurita PT                         I CERTIFY THE NEED FOR THESE SERVICES FURNISHED UNDER        THIS PLAN OF TREATMENT AND WHILE UNDER MY CARE .             Physician Signature               Date    X_____________________________________________________                        Referring Provider:  Tobias Gusman      Initial Assessment  See Epic Evaluation- Start of Care Date: 05/22/23

## 2023-06-01 ENCOUNTER — THERAPY VISIT (OUTPATIENT)
Dept: PHYSICAL THERAPY | Facility: CLINIC | Age: 73
End: 2023-06-01
Payer: MEDICARE

## 2023-06-01 DIAGNOSIS — Z96.651 S/P TOTAL KNEE ARTHROPLASTY, RIGHT: ICD-10-CM

## 2023-06-01 DIAGNOSIS — Z96.652 S/P TOTAL KNEE ARTHROPLASTY, LEFT: Primary | ICD-10-CM

## 2023-06-01 DIAGNOSIS — S76.311A HAMSTRING MUSCLE STRAIN, RIGHT, INITIAL ENCOUNTER: ICD-10-CM

## 2023-06-01 PROCEDURE — 97112 NEUROMUSCULAR REEDUCATION: CPT | Mod: GP | Performed by: PHYSICAL THERAPIST

## 2023-06-01 PROCEDURE — 97110 THERAPEUTIC EXERCISES: CPT | Mod: GP | Performed by: PHYSICAL THERAPIST

## 2023-07-24 PROBLEM — S76.311A HAMSTRING MUSCLE STRAIN, RIGHT, INITIAL ENCOUNTER: Status: RESOLVED | Noted: 2023-05-22 | Resolved: 2023-07-24

## 2023-07-24 PROBLEM — Z96.651 S/P TOTAL KNEE ARTHROPLASTY, RIGHT: Status: RESOLVED | Noted: 2023-05-22 | Resolved: 2023-07-24

## 2023-07-24 PROBLEM — Z96.652 S/P TOTAL KNEE ARTHROPLASTY, LEFT: Status: RESOLVED | Noted: 2023-05-22 | Resolved: 2023-07-24

## 2023-07-24 NOTE — PROGRESS NOTES
DISCHARGE  Reason for Discharge: Patient chooses to discontinue therapy.  Patient will be seeing another MD regarding this problem.     Equipment Issued:     Discharge Plan: Patient to continue home program.    Referring Provider:  Tobias Gusman

## 2023-10-19 ENCOUNTER — TRANSCRIBE ORDERS (OUTPATIENT)
Dept: OTHER | Age: 73
End: 2023-10-19

## 2023-10-19 DIAGNOSIS — Z96.649 S/P REVISION OF TOTAL HIP: Primary | ICD-10-CM

## 2023-10-30 ENCOUNTER — THERAPY VISIT (OUTPATIENT)
Dept: PHYSICAL THERAPY | Facility: CLINIC | Age: 73
End: 2023-10-30
Payer: MEDICARE

## 2023-10-30 DIAGNOSIS — M25.551 HIP PAIN, RIGHT: Primary | ICD-10-CM

## 2023-10-30 DIAGNOSIS — Z47.89 SURGICAL AFTERCARE, MUSCULOSKELETAL SYSTEM: ICD-10-CM

## 2023-10-30 PROCEDURE — 97110 THERAPEUTIC EXERCISES: CPT | Mod: GP | Performed by: PHYSICAL THERAPIST

## 2023-10-30 PROCEDURE — 97161 PT EVAL LOW COMPLEX 20 MIN: CPT | Mod: GP | Performed by: PHYSICAL THERAPIST

## 2023-10-30 ASSESSMENT — ACTIVITIES OF DAILY LIVING (ADL)
HOS_ADL_HIGHEST_POTENTIAL_SCORE: 60
GOING_DOWN_1_FLIGHT_OF_STAIRS: UNABLE TO DO
GETTING_INTO_AND_OUT_OF_A_BATHTUB: NO DIFFICULTY AT ALL
GOING_UP_1_FLIGHT_OF_STAIRS: MODERATE DIFFICULTY
SITTING_FOR_15_MINUTES: MODERATE DIFFICULTY
HOS_ADL_SCORE(%): 36.67
WALKING_APPROXIMATELY_10_MINUTES: MODERATE DIFFICULTY
HOS_ADL_COUNT: 15
ROLLING_OVER_IN_BED: MODERATE DIFFICULTY
TWISTING/PIVOTING_ON_INVOLVED_LEG: MODERATE DIFFICULTY
WALKING_DOWN_STEEP_HILLS: MODERATE DIFFICULTY
STEPPING_UP_AND_DOWN_CURBS: UNABLE TO DO
STANDING_FOR_15_MINUTES: SLIGHT DIFFICULTY
HOS_ADL_ITEM_SCORE_TOTAL: 22
WALKING_INITIALLY: EXTREME DIFFICULTY
DEEP_SQUATTING: UNABLE TO DO
WALKING_15_MINUTES_OR_GREATER: UNABLE TO DO
WALKING_UP_STEEP_HILLS: UNABLE TO DO
PUTTING_ON_SOCKS_AND_SHOES: UNABLE TO DO
GETTING_INTO_AND_OUT_OF_AN_AVERAGE_CAR: SLIGHT DIFFICULTY
RECREATIONAL_ACTIVITIES: EXTREME DIFFICULTY

## 2023-10-30 NOTE — PROGRESS NOTES
PHYSICAL THERAPY EVALUATION  Type of Visit: Evaluation    See electronic medical record for Abuse and Falls Screening details.    Subjective       Presenting condition or subjective complaint: walk properly, strength hip and knee Pt has hx of R MICHELL in 2008. More recently in April 2023 his R hip started to get sore again without known cause. MRI in June showed some loose hardward on femur per patient. Fell in August was moving things around in his sun room and dislocated and fell. Ended up having revision surgery MICHELL on 9/13/23. Was TTWB on walker for 5 wks, not using SEC (forgot it today).     Date of onset: 09/13/23    Relevant medical history: Arthritis; High blood pressure; History of fractures   Dates & types of surgery: quad bypass, L MICHELL 2008, L TKA 2022    Prior diagnostic imaging/testing results: MRI; CT scan; X-ray     Prior therapy history for the same diagnosis, illness or injury: No      Prior Level of Function  Transfers:   Ambulation: Independent  ADL:   IADL:     Living Environment  Social support: With a significant other or spouse   Type of home: Basement   Stairs to enter the home: Yes 3 Is there a railing: Yes   Ramp: No   Stairs inside the home: No 18     Help at home: None  Equipment owned: Straight Cane     Employment:   retired  Hobbies/Interests: golf, Twins, [employer], cribbage,    Patient goals for therapy: walk properly    Pain assessment: See objective evaluation for additional pain details     Objective   HIP EVALUATION  PAIN: Pain Level with Use: 3/10  Pain Location: lateral  Pain Quality: Dull  Pain Frequency: intermittent  Pain is Worst: activity dependent  Pain is Exacerbated By: sitting too long, walking too long  Pain is Relieved By: rest as needed  Pain Progression: Unchanged  INTEGUMENTARY (edema, incisions):  incision healing well  POSTURE:   GAIT:   Weightbearing Status:   Assistive Device(s): Cane (single end), patient uses cane at home but forgot today  Gait Deviations: Marya  decreased  Trendelenberg R  BALANCE/PROPRIOCEPTION:   WEIGHTBEARING ALIGNMENT:   NON-WEIGHTBEARING ALIGNMENT:    ROM:  not formally assessed, patient is adhering to proper restreictions    PELVIC/SI SCREEN:   STRENGTH:  Able to perform 10 reps standing hip abduction without issue, long arc quads at least 15 reps  LE FLEXIBILITY:   SPECIAL TESTS:   FUNCTIONAL TESTS:   PALPATION:   JOINT MOBILITY:     Assessment & Plan   CLINICAL IMPRESSIONS  Medical Diagnosis: S/p revision of total hip    Treatment Diagnosis: s/p R hip MICHELL revision   Impression/Assessment: Patient is a 73 year old male with R hip complaints.  The following significant findings have been identified: Pain, Decreased ROM/flexibility, Decreased joint mobility, Decreased strength, Inflammation, Impaired gait, and Decreased activity tolerance. These impairments interfere with their ability to perform self care tasks, recreational activities, household chores, household mobility, and community mobility as compared to previous level of function.     Clinical Decision Making (Complexity):  Clinical Presentation: Evolving/Changing  Clinical Presentation Rationale: based on medical and personal factors listed in PT evaluation  Clinical Decision Making (Complexity): Low complexity    PLAN OF CARE  Treatment Interventions:  Interventions: Manual Therapy, Neuromuscular Re-education, Therapeutic Activity, Therapeutic Exercise    Long Term Goals     PT Goal 1  Goal Identifier: ambulation  Goal Description: Pt will be able to walk for 30 min without assistive device  Rationale: to maximize safety and independence with performance of ADLs and functional tasks;to maximize safety and independence within the home;to maximize safety and independence within the community  Target Date: 01/22/24      Frequency of Treatment: 1x/wk  Duration of Treatment: 12 wks    Recommended Referrals to Other Professionals:   Education Assessment:        Risks and benefits of  evaluation/treatment have been explained.   Patient/Family/caregiver agrees with Plan of Care.     Evaluation Time:             Signing Clinician: Jack Liu, DAE      [facility]                                                                                   OUTPATIENT PHYSICAL THERAPY      PLAN OF TREATMENT FOR OUTPATIENT REHABILITATION   Patient's Last Name, First Name, Sudarshan Agrawal YOB: 1950   Provider's Name   [facility]   Medical Record No.  [id number]     Onset Date: 09/13/23  Start of Care Date: 10/30/23     Medical Diagnosis:  S/p revision of total hip      PT Treatment Diagnosis:  s/p R hip MICHELL revision Plan of Treatment  Frequency/Duration: 1x/wk/ 12 wks    Certification date from 10/30/23 to 01/22/24         See note for plan of treatment details and functional goals     Jack Liu, PT                         I CERTIFY THE NEED FOR THESE SERVICES FURNISHED UNDER        THIS PLAN OF TREATMENT AND WHILE UNDER MY CARE .             Physician Signature               Date    X_____________________________________________________                    Referring Provider:  Wilfrid Pierson      Initial Assessment  See Epic Evaluation- Start of Care Date: 10/30/23

## 2023-11-06 ENCOUNTER — THERAPY VISIT (OUTPATIENT)
Dept: PHYSICAL THERAPY | Facility: CLINIC | Age: 73
End: 2023-11-06
Payer: MEDICARE

## 2023-11-06 DIAGNOSIS — M25.551 HIP PAIN, RIGHT: Primary | ICD-10-CM

## 2023-11-06 DIAGNOSIS — Z47.89 SURGICAL AFTERCARE, MUSCULOSKELETAL SYSTEM: ICD-10-CM

## 2023-11-06 PROCEDURE — 97530 THERAPEUTIC ACTIVITIES: CPT | Mod: GP | Performed by: PHYSICAL THERAPIST

## 2023-11-06 PROCEDURE — 97110 THERAPEUTIC EXERCISES: CPT | Mod: GP | Performed by: PHYSICAL THERAPIST

## 2023-11-06 PROCEDURE — 97140 MANUAL THERAPY 1/> REGIONS: CPT | Mod: GP | Performed by: PHYSICAL THERAPIST

## 2023-11-13 ENCOUNTER — THERAPY VISIT (OUTPATIENT)
Dept: PHYSICAL THERAPY | Facility: CLINIC | Age: 73
End: 2023-11-13
Payer: MEDICARE

## 2023-11-13 DIAGNOSIS — M25.551 HIP PAIN, RIGHT: Primary | ICD-10-CM

## 2023-11-13 DIAGNOSIS — Z47.89 SURGICAL AFTERCARE, MUSCULOSKELETAL SYSTEM: ICD-10-CM

## 2023-11-13 PROCEDURE — 97530 THERAPEUTIC ACTIVITIES: CPT | Mod: GP | Performed by: PHYSICAL THERAPIST

## 2023-11-13 PROCEDURE — 97110 THERAPEUTIC EXERCISES: CPT | Mod: GP | Performed by: PHYSICAL THERAPIST

## 2023-11-13 PROCEDURE — 97140 MANUAL THERAPY 1/> REGIONS: CPT | Mod: GP | Performed by: PHYSICAL THERAPIST

## 2023-11-17 ENCOUNTER — THERAPY VISIT (OUTPATIENT)
Dept: PHYSICAL THERAPY | Facility: CLINIC | Age: 73
End: 2023-11-17
Payer: MEDICARE

## 2023-11-17 DIAGNOSIS — M25.551 HIP PAIN, RIGHT: Primary | ICD-10-CM

## 2023-11-17 DIAGNOSIS — Z47.89 SURGICAL AFTERCARE, MUSCULOSKELETAL SYSTEM: ICD-10-CM

## 2023-11-17 PROCEDURE — 97530 THERAPEUTIC ACTIVITIES: CPT | Mod: GP | Performed by: PHYSICAL THERAPIST

## 2023-11-17 PROCEDURE — 97140 MANUAL THERAPY 1/> REGIONS: CPT | Mod: GP | Performed by: PHYSICAL THERAPIST

## 2023-11-17 PROCEDURE — 97110 THERAPEUTIC EXERCISES: CPT | Mod: GP | Performed by: PHYSICAL THERAPIST

## 2023-11-17 NOTE — PROGRESS NOTES
"    DISCHARGE  Reason for Discharge: Pt moving for [place] for winter.     Equipment Issued: red theraband    Discharge Plan: Patient to continue home program.   11/17/23 0500   Appointment Info   Signing clinician's name / credentials Jack Liu DPT   Total/Authorized Visits 12   Visits Used 4   Medical Diagnosis S/p revision of total hip   PT Tx Diagnosis s/p R hip MICHELL revision   Quick Adds Certification   Progress Note/Certification   Start of Care Date 10/30/23   Onset of illness/injury or Date of Surgery 09/13/23   Therapy Frequency 1x/wk   Predicted Duration 12 wks   Certification date from 10/30/23   Certification date to 01/22/24   Progress Note Completed Date 10/30/23   PT Goal 1   Goal Identifier ambulation   Goal Description Pt will be able to walk for 30 min without assistive device   Rationale to maximize safety and independence with performance of ADLs and functional tasks;to maximize safety and independence within the home;to maximize safety and independence within the community   Goal Progress 30 min with cane as of 11/17   Target Date 01/22/24   Subjective Report   Subjective Report Doing well. Exeercises good. Planning to head to [place] for winter next week. Walking up to 30 min with can.   Objective Measures   Objective Measures Objective Measure 2   Objective Measure 1   Objective Measure gait   Details Slight limp with use of cane   Objective Measure 2   Objective Measure R hip strength   Details unable to complete full range R hip abduction in sidelying, good technique on 30 reps of clamshell, able to perform 4\" step up with slight hip hik   Treatment Interventions (PT)   Interventions Therapeutic Procedure/Exercise;Therapeutic Activity;Neuromuscular Re-education;Gait Training;Manual Therapy   Therapeutic Procedure/Exercise   Therapeutic Procedures: strength, endurance, ROM, flexibillity minutes (08323) 16   PTRx Ther Proc 1 Bridging #1   PTRx Ther Proc 1 - Details regular bridge x 30 " "with RTB   PTRx Ther Proc 2 Toe Raises   PTRx Ther Proc 2 - Details x30   PTRx Ther Proc 3 Hip AROM Standing Abduction   PTRx Ther Proc 3 - Details HEP   PTRx Ther Proc 4 Short Arc Knee Extension   PTRx Ther Proc 4 - Details 3 lb x 30   PTRx Ther Proc 5 Clamshell Feet together   PTRx Ther Proc 5 - Details x30   PTRx Ther Proc 6 Sit to Stand   PTRx Ther Proc 6 - Details x8   Therapeutic Activity   Therapeutic Activities: dynamic activities to improve functional performance minutes (88452) 12   Ther Act 1 bike for warm up/gentle ROM per request, careful to avoid going past 90 deg   Ther Act 1 - Details SH 3 (sports art bike) x 5'   PTRx Ther Act 1 Sit to Stand   PTRx Ther Act 1 - Details x25 from plint   PTRx Ther Act 2 Stair Step Ups   PTRx Ther Act 2 - Details 4\" height x 30   Manual Therapy   Manual Therapy: Mobilization, MFR, MLD, friction massage minutes (34793) 10   Manual Therapy 1 MFR R TFL   Manual Therapy 1 - Details 10   Patient Response/Progress \"felt good\"   Plan   Home program see PTRx   Updates to plan of care clam, band around knees for bridge   Plan for next session d/c to HEP as pt moving to [place] for winter   Total Session Time   Timed Code Treatment Minutes 38   Total Treatment Time (sum of timed and untimed services) 38         Referring Provider:  Wilfrid Pierson    "